# Patient Record
Sex: FEMALE | Race: OTHER
[De-identification: names, ages, dates, MRNs, and addresses within clinical notes are randomized per-mention and may not be internally consistent; named-entity substitution may affect disease eponyms.]

---

## 2017-09-13 ENCOUNTER — RESULT REVIEW (OUTPATIENT)
Age: 52
End: 2017-09-13

## 2021-09-26 ENCOUNTER — APPOINTMENT (OUTPATIENT)
Dept: MRI IMAGING | Facility: CLINIC | Age: 56
End: 2021-09-26
Payer: OTHER MISCELLANEOUS

## 2021-09-26 ENCOUNTER — OUTPATIENT (OUTPATIENT)
Dept: OUTPATIENT SERVICES | Facility: HOSPITAL | Age: 56
LOS: 1 days | End: 2021-09-26
Payer: COMMERCIAL

## 2021-09-26 DIAGNOSIS — Z00.8 ENCOUNTER FOR OTHER GENERAL EXAMINATION: ICD-10-CM

## 2021-09-26 PROCEDURE — 73221 MRI JOINT UPR EXTREM W/O DYE: CPT

## 2021-09-26 PROCEDURE — 73221 MRI JOINT UPR EXTREM W/O DYE: CPT | Mod: 26,LT

## 2022-01-13 ENCOUNTER — APPOINTMENT (OUTPATIENT)
Dept: ORTHOPEDIC SURGERY | Facility: CLINIC | Age: 57
End: 2022-01-13
Payer: OTHER MISCELLANEOUS

## 2022-01-13 ENCOUNTER — FORM ENCOUNTER (OUTPATIENT)
Age: 57
End: 2022-01-13

## 2022-01-13 VITALS
SYSTOLIC BLOOD PRESSURE: 150 MMHG | WEIGHT: 143 LBS | DIASTOLIC BLOOD PRESSURE: 85 MMHG | HEIGHT: 63 IN | HEART RATE: 76 BPM | BODY MASS INDEX: 25.34 KG/M2

## 2022-01-13 DIAGNOSIS — M75.02 ADHESIVE CAPSULITIS OF LEFT SHOULDER: ICD-10-CM

## 2022-01-13 DIAGNOSIS — M75.102 UNSPECIFIED ROTATOR CUFF TEAR OR RUPTURE OF LEFT SHOULDER, NOT SPECIFIED AS TRAUMATIC: ICD-10-CM

## 2022-01-13 PROCEDURE — 99203 OFFICE O/P NEW LOW 30 MIN: CPT

## 2022-01-13 PROCEDURE — 99072 ADDL SUPL MATRL&STAF TM PHE: CPT

## 2022-01-13 RX ORDER — ACETAMINOPHEN 500 MG
500 TABLET ORAL
Refills: 0 | Status: ACTIVE | COMMUNITY

## 2022-01-18 ENCOUNTER — FORM ENCOUNTER (OUTPATIENT)
Age: 57
End: 2022-01-18

## 2022-03-25 ENCOUNTER — APPOINTMENT (OUTPATIENT)
Dept: ORTHOPEDIC SURGERY | Facility: CLINIC | Age: 57
End: 2022-03-25

## 2023-11-22 ENCOUNTER — INPATIENT (INPATIENT)
Facility: HOSPITAL | Age: 58
LOS: 0 days | Discharge: HOME | DRG: 149 | End: 2023-11-23
Attending: FAMILY MEDICINE | Admitting: FAMILY MEDICINE
Payer: COMMERCIAL

## 2023-11-22 VITALS — WEIGHT: 149.91 LBS | HEIGHT: 63 IN

## 2023-11-22 DIAGNOSIS — R42 DIZZINESS AND GIDDINESS: ICD-10-CM

## 2023-11-22 LAB
A1C WITH ESTIMATED AVERAGE GLUCOSE RESULT: 6.1 % — HIGH (ref 4–5.6)
A1C WITH ESTIMATED AVERAGE GLUCOSE RESULT: 6.1 % — HIGH (ref 4–5.6)
ALBUMIN SERPL ELPH-MCNC: 3.7 G/DL — SIGNIFICANT CHANGE UP (ref 3.3–5)
ALBUMIN SERPL ELPH-MCNC: 3.7 G/DL — SIGNIFICANT CHANGE UP (ref 3.3–5)
ALP SERPL-CCNC: 85 U/L — SIGNIFICANT CHANGE UP (ref 40–120)
ALP SERPL-CCNC: 85 U/L — SIGNIFICANT CHANGE UP (ref 40–120)
ALT FLD-CCNC: 24 U/L — SIGNIFICANT CHANGE UP (ref 12–78)
ALT FLD-CCNC: 24 U/L — SIGNIFICANT CHANGE UP (ref 12–78)
ANION GAP SERPL CALC-SCNC: 6 MMOL/L — SIGNIFICANT CHANGE UP (ref 5–17)
ANION GAP SERPL CALC-SCNC: 6 MMOL/L — SIGNIFICANT CHANGE UP (ref 5–17)
APTT BLD: 35.4 SEC — SIGNIFICANT CHANGE UP (ref 24.5–35.6)
APTT BLD: 35.4 SEC — SIGNIFICANT CHANGE UP (ref 24.5–35.6)
AST SERPL-CCNC: 13 U/L — LOW (ref 15–37)
AST SERPL-CCNC: 13 U/L — LOW (ref 15–37)
BASOPHILS # BLD AUTO: 0.03 K/UL — SIGNIFICANT CHANGE UP (ref 0–0.2)
BASOPHILS # BLD AUTO: 0.03 K/UL — SIGNIFICANT CHANGE UP (ref 0–0.2)
BASOPHILS NFR BLD AUTO: 0.5 % — SIGNIFICANT CHANGE UP (ref 0–2)
BASOPHILS NFR BLD AUTO: 0.5 % — SIGNIFICANT CHANGE UP (ref 0–2)
BILIRUB SERPL-MCNC: 0.3 MG/DL — SIGNIFICANT CHANGE UP (ref 0.2–1.2)
BILIRUB SERPL-MCNC: 0.3 MG/DL — SIGNIFICANT CHANGE UP (ref 0.2–1.2)
BUN SERPL-MCNC: 12 MG/DL — SIGNIFICANT CHANGE UP (ref 7–23)
BUN SERPL-MCNC: 12 MG/DL — SIGNIFICANT CHANGE UP (ref 7–23)
CALCIUM SERPL-MCNC: 9 MG/DL — SIGNIFICANT CHANGE UP (ref 8.5–10.1)
CALCIUM SERPL-MCNC: 9 MG/DL — SIGNIFICANT CHANGE UP (ref 8.5–10.1)
CHLORIDE SERPL-SCNC: 107 MMOL/L — SIGNIFICANT CHANGE UP (ref 96–108)
CHLORIDE SERPL-SCNC: 107 MMOL/L — SIGNIFICANT CHANGE UP (ref 96–108)
CK SERPL-CCNC: 47 U/L — SIGNIFICANT CHANGE UP (ref 26–192)
CK SERPL-CCNC: 47 U/L — SIGNIFICANT CHANGE UP (ref 26–192)
CO2 SERPL-SCNC: 25 MMOL/L — SIGNIFICANT CHANGE UP (ref 22–31)
CO2 SERPL-SCNC: 25 MMOL/L — SIGNIFICANT CHANGE UP (ref 22–31)
CREAT SERPL-MCNC: 0.62 MG/DL — SIGNIFICANT CHANGE UP (ref 0.5–1.3)
CREAT SERPL-MCNC: 0.62 MG/DL — SIGNIFICANT CHANGE UP (ref 0.5–1.3)
EGFR: 103 ML/MIN/1.73M2 — SIGNIFICANT CHANGE UP
EGFR: 103 ML/MIN/1.73M2 — SIGNIFICANT CHANGE UP
EOSINOPHIL # BLD AUTO: 0.11 K/UL — SIGNIFICANT CHANGE UP (ref 0–0.5)
EOSINOPHIL # BLD AUTO: 0.11 K/UL — SIGNIFICANT CHANGE UP (ref 0–0.5)
EOSINOPHIL NFR BLD AUTO: 1.7 % — SIGNIFICANT CHANGE UP (ref 0–6)
EOSINOPHIL NFR BLD AUTO: 1.7 % — SIGNIFICANT CHANGE UP (ref 0–6)
ESTIMATED AVERAGE GLUCOSE: 128 MG/DL — HIGH (ref 68–114)
ESTIMATED AVERAGE GLUCOSE: 128 MG/DL — HIGH (ref 68–114)
GLUCOSE SERPL-MCNC: 112 MG/DL — HIGH (ref 70–99)
GLUCOSE SERPL-MCNC: 112 MG/DL — HIGH (ref 70–99)
HCT VFR BLD CALC: 40.2 % — SIGNIFICANT CHANGE UP (ref 34.5–45)
HCT VFR BLD CALC: 40.2 % — SIGNIFICANT CHANGE UP (ref 34.5–45)
HGB BLD-MCNC: 13.5 G/DL — SIGNIFICANT CHANGE UP (ref 11.5–15.5)
HGB BLD-MCNC: 13.5 G/DL — SIGNIFICANT CHANGE UP (ref 11.5–15.5)
IMM GRANULOCYTES NFR BLD AUTO: 0.3 % — SIGNIFICANT CHANGE UP (ref 0–0.9)
IMM GRANULOCYTES NFR BLD AUTO: 0.3 % — SIGNIFICANT CHANGE UP (ref 0–0.9)
INR BLD: 0.91 RATIO — SIGNIFICANT CHANGE UP (ref 0.85–1.18)
INR BLD: 0.91 RATIO — SIGNIFICANT CHANGE UP (ref 0.85–1.18)
LYMPHOCYTES # BLD AUTO: 2.64 K/UL — SIGNIFICANT CHANGE UP (ref 1–3.3)
LYMPHOCYTES # BLD AUTO: 2.64 K/UL — SIGNIFICANT CHANGE UP (ref 1–3.3)
LYMPHOCYTES # BLD AUTO: 41.4 % — SIGNIFICANT CHANGE UP (ref 13–44)
LYMPHOCYTES # BLD AUTO: 41.4 % — SIGNIFICANT CHANGE UP (ref 13–44)
MCHC RBC-ENTMCNC: 28.8 PG — SIGNIFICANT CHANGE UP (ref 27–34)
MCHC RBC-ENTMCNC: 28.8 PG — SIGNIFICANT CHANGE UP (ref 27–34)
MCHC RBC-ENTMCNC: 33.6 GM/DL — SIGNIFICANT CHANGE UP (ref 32–36)
MCHC RBC-ENTMCNC: 33.6 GM/DL — SIGNIFICANT CHANGE UP (ref 32–36)
MCV RBC AUTO: 85.7 FL — SIGNIFICANT CHANGE UP (ref 80–100)
MCV RBC AUTO: 85.7 FL — SIGNIFICANT CHANGE UP (ref 80–100)
MONOCYTES # BLD AUTO: 0.45 K/UL — SIGNIFICANT CHANGE UP (ref 0–0.9)
MONOCYTES # BLD AUTO: 0.45 K/UL — SIGNIFICANT CHANGE UP (ref 0–0.9)
MONOCYTES NFR BLD AUTO: 7.1 % — SIGNIFICANT CHANGE UP (ref 2–14)
MONOCYTES NFR BLD AUTO: 7.1 % — SIGNIFICANT CHANGE UP (ref 2–14)
NEUTROPHILS # BLD AUTO: 3.13 K/UL — SIGNIFICANT CHANGE UP (ref 1.8–7.4)
NEUTROPHILS # BLD AUTO: 3.13 K/UL — SIGNIFICANT CHANGE UP (ref 1.8–7.4)
NEUTROPHILS NFR BLD AUTO: 49 % — SIGNIFICANT CHANGE UP (ref 43–77)
NEUTROPHILS NFR BLD AUTO: 49 % — SIGNIFICANT CHANGE UP (ref 43–77)
PLATELET # BLD AUTO: 320 K/UL — SIGNIFICANT CHANGE UP (ref 150–400)
PLATELET # BLD AUTO: 320 K/UL — SIGNIFICANT CHANGE UP (ref 150–400)
POTASSIUM SERPL-MCNC: 3.7 MMOL/L — SIGNIFICANT CHANGE UP (ref 3.5–5.3)
POTASSIUM SERPL-MCNC: 3.7 MMOL/L — SIGNIFICANT CHANGE UP (ref 3.5–5.3)
POTASSIUM SERPL-SCNC: 3.7 MMOL/L — SIGNIFICANT CHANGE UP (ref 3.5–5.3)
POTASSIUM SERPL-SCNC: 3.7 MMOL/L — SIGNIFICANT CHANGE UP (ref 3.5–5.3)
PROT SERPL-MCNC: 7.8 GM/DL — SIGNIFICANT CHANGE UP (ref 6–8.3)
PROT SERPL-MCNC: 7.8 GM/DL — SIGNIFICANT CHANGE UP (ref 6–8.3)
PROTHROM AB SERPL-ACNC: 10.3 SEC — SIGNIFICANT CHANGE UP (ref 9.5–13)
PROTHROM AB SERPL-ACNC: 10.3 SEC — SIGNIFICANT CHANGE UP (ref 9.5–13)
RBC # BLD: 4.69 M/UL — SIGNIFICANT CHANGE UP (ref 3.8–5.2)
RBC # BLD: 4.69 M/UL — SIGNIFICANT CHANGE UP (ref 3.8–5.2)
RBC # FLD: 12.9 % — SIGNIFICANT CHANGE UP (ref 10.3–14.5)
RBC # FLD: 12.9 % — SIGNIFICANT CHANGE UP (ref 10.3–14.5)
SODIUM SERPL-SCNC: 138 MMOL/L — SIGNIFICANT CHANGE UP (ref 135–145)
SODIUM SERPL-SCNC: 138 MMOL/L — SIGNIFICANT CHANGE UP (ref 135–145)
TROPONIN I, HIGH SENSITIVITY RESULT: 14.75 NG/L — SIGNIFICANT CHANGE UP
TROPONIN I, HIGH SENSITIVITY RESULT: 14.75 NG/L — SIGNIFICANT CHANGE UP
WBC # BLD: 6.38 K/UL — SIGNIFICANT CHANGE UP (ref 3.8–10.5)
WBC # BLD: 6.38 K/UL — SIGNIFICANT CHANGE UP (ref 3.8–10.5)
WBC # FLD AUTO: 6.38 K/UL — SIGNIFICANT CHANGE UP (ref 3.8–10.5)
WBC # FLD AUTO: 6.38 K/UL — SIGNIFICANT CHANGE UP (ref 3.8–10.5)

## 2023-11-22 PROCEDURE — 99223 1ST HOSP IP/OBS HIGH 75: CPT

## 2023-11-22 PROCEDURE — G1004: CPT

## 2023-11-22 PROCEDURE — 97161 PT EVAL LOW COMPLEX 20 MIN: CPT | Mod: GP

## 2023-11-22 PROCEDURE — 70498 CT ANGIOGRAPHY NECK: CPT | Mod: 26,MA

## 2023-11-22 PROCEDURE — 93010 ELECTROCARDIOGRAM REPORT: CPT

## 2023-11-22 PROCEDURE — 70496 CT ANGIOGRAPHY HEAD: CPT | Mod: 26,MA

## 2023-11-22 PROCEDURE — 36415 COLL VENOUS BLD VENIPUNCTURE: CPT

## 2023-11-22 PROCEDURE — 99285 EMERGENCY DEPT VISIT HI MDM: CPT

## 2023-11-22 PROCEDURE — 93005 ELECTROCARDIOGRAM TRACING: CPT

## 2023-11-22 PROCEDURE — 99222 1ST HOSP IP/OBS MODERATE 55: CPT

## 2023-11-22 PROCEDURE — 70551 MRI BRAIN STEM W/O DYE: CPT | Mod: 26

## 2023-11-22 PROCEDURE — 0042T: CPT | Mod: MA

## 2023-11-22 PROCEDURE — 85027 COMPLETE CBC AUTOMATED: CPT

## 2023-11-22 PROCEDURE — 80061 LIPID PANEL: CPT

## 2023-11-22 PROCEDURE — 70450 CT HEAD/BRAIN W/O DYE: CPT | Mod: 26,MA,59

## 2023-11-22 PROCEDURE — 71045 X-RAY EXAM CHEST 1 VIEW: CPT | Mod: 26

## 2023-11-22 PROCEDURE — 93306 TTE W/DOPPLER COMPLETE: CPT

## 2023-11-22 PROCEDURE — 70551 MRI BRAIN STEM W/O DYE: CPT | Mod: ME

## 2023-11-22 PROCEDURE — 86803 HEPATITIS C AB TEST: CPT

## 2023-11-22 PROCEDURE — 99497 ADVNCD CARE PLAN 30 MIN: CPT | Mod: 25

## 2023-11-22 PROCEDURE — 80048 BASIC METABOLIC PNL TOTAL CA: CPT

## 2023-11-22 RX ORDER — MECLIZINE HCL 12.5 MG
25 TABLET ORAL EVERY 8 HOURS
Refills: 0 | Status: DISCONTINUED | OUTPATIENT
Start: 2023-11-22 | End: 2023-11-23

## 2023-11-22 RX ORDER — MECLIZINE HCL 12.5 MG
25 TABLET ORAL ONCE
Refills: 0 | Status: COMPLETED | OUTPATIENT
Start: 2023-11-22 | End: 2023-11-22

## 2023-11-22 RX ORDER — ACETAMINOPHEN 500 MG
650 TABLET ORAL ONCE
Refills: 0 | Status: COMPLETED | OUTPATIENT
Start: 2023-11-22 | End: 2023-11-22

## 2023-11-22 RX ORDER — ONDANSETRON 8 MG/1
4 TABLET, FILM COATED ORAL ONCE
Refills: 0 | Status: DISCONTINUED | OUTPATIENT
Start: 2023-11-22 | End: 2023-11-23

## 2023-11-22 RX ORDER — ATORVASTATIN CALCIUM 80 MG/1
40 TABLET, FILM COATED ORAL AT BEDTIME
Refills: 0 | Status: DISCONTINUED | OUTPATIENT
Start: 2023-11-22 | End: 2023-11-23

## 2023-11-22 RX ORDER — SODIUM CHLORIDE 9 MG/ML
1000 INJECTION INTRAMUSCULAR; INTRAVENOUS; SUBCUTANEOUS
Refills: 0 | Status: DISCONTINUED | OUTPATIENT
Start: 2023-11-22 | End: 2023-11-23

## 2023-11-22 RX ADMIN — Medication 25 MILLIGRAM(S): at 21:23

## 2023-11-22 RX ADMIN — Medication 650 MILLIGRAM(S): at 12:21

## 2023-11-22 RX ADMIN — Medication 25 MILLIGRAM(S): at 12:21

## 2023-11-22 RX ADMIN — ATORVASTATIN CALCIUM 40 MILLIGRAM(S): 80 TABLET, FILM COATED ORAL at 21:22

## 2023-11-22 RX ADMIN — Medication 650 MILLIGRAM(S): at 14:11

## 2023-11-22 RX ADMIN — SODIUM CHLORIDE 75 MILLILITER(S): 9 INJECTION INTRAMUSCULAR; INTRAVENOUS; SUBCUTANEOUS at 14:17

## 2023-11-22 NOTE — ED PROVIDER NOTE - OBJECTIVE STATEMENT
57 yo female w/ no PMHx presents to the ED with dizziness onset last night and HA onset this morning. Pt had one episode of dizziness last night before going to bed at 11pm, no lightheadedness, no room spinning sensation. Pt then woke up with dizziness this morning and was unable to drive due to feeling off balance. Pt was unable to drive to the ED this morning and had to have her son drive her to the ED. Pt in the car on the way here developed an occipital HA. Pt denying nausea, no blurry vision, no double vision, no weakness or numbness in any of her extremities. Code Stroke called upon arrival to ED. 59 yo female w/PMHx congential hemangioma, not on any medications presents to the ED with dizziness onset last night and HA onset this morning. Pt had one episode of dizziness last night before going to bed at 11pm, no lightheadedness, no room spinning sensation. Pt then woke up with dizziness this morning and was unable to drive due to feeling off balance. Pt was unable to drive to the ED this morning and had to have her son drive her to the ED. Pt in the car on the way here developed an occipital HA. Pt denying nausea, no blurry vision, no double vision, no weakness or numbness in any of her extremities. Code Stroke called upon arrival to ED. Pt not on AC. 57 yo female w/ PMHx of congenital hemangioma, not on any medications, BIB pvt car to the ED with dizziness onset last night, worsened this AM and HA onset this morning. Pt had one episode of dizziness last night before going to bed at 11pm, no lightheadedness, no room spinning sensation. After awoke this AM, pt noted continued dizziness, + worse and was unable to drive due to feeling off balance. Pt was unable to drive to the ED this morning and had to have her son drive her to the ED. Pt in the car on the way here developed an occipital HA. Pt denies nausea, no blurry vision, no double vision, no focal weakness or numbness in her face nor extremities. Code Stroke called upon arrival to ED. Pt not on AC med nor ASA.

## 2023-11-22 NOTE — PHARMACOTHERAPY INTERVENTION NOTE - COMMENTS
Medication history complete, reviewed medications with patient and confirmed with doctor first med hx. Patient is not on meds at home.

## 2023-11-22 NOTE — ED ADULT TRIAGE NOTE - CHIEF COMPLAINT QUOTE
Pt ambulatory to ED c/o new onset dizziness since 11pm last night w/ headache x1 hour ago. Pt states that last night before she went to bed she felt "off balance". This morning she woke up and felt like symptoms are worsening, pt unable to walk w/out feeling dizzy. BEFAST +. MD Gifford in triage to assess, code stroke called at 0946.

## 2023-11-22 NOTE — H&P ADULT - NSHPLABSRESULTS_GEN_ALL_CORE
EKG NSR@ 89 bpm, nonspecific ST/T wave changes     Prelim CXR No infiltrates or effusion    Lab Results:  CBC  CBC Full  -  ( 22 Nov 2023 10:00 )  WBC Count : 6.38 K/uL  RBC Count : 4.69 M/uL  Hemoglobin : 13.5 g/dL  Hematocrit : 40.2 %  Platelet Count - Automated : 320 K/uL  Mean Cell Volume : 85.7 fl  Mean Cell Hemoglobin : 28.8 pg  Mean Cell Hemoglobin Concentration : 33.6 gm/dL  Auto Neutrophil # : 3.13 K/uL  Auto Lymphocyte # : 2.64 K/uL  Auto Monocyte # : 0.45 K/uL  Auto Eosinophil # : 0.11 K/uL  Auto Basophil # : 0.03 K/uL  Auto Neutrophil % : 49.0 %  Auto Lymphocyte % : 41.4 %  Auto Monocyte % : 7.1 %  Auto Eosinophil % : 1.7 %  Auto Basophil % : 0.5 %    .		Differential:	[] Automated		[] Manual  Chemistry                        13.5   6.38  )-----------( 320      ( 22 Nov 2023 10:00 )             40.2     11-22    138  |  107  |  12  ----------------------------<  112<H>  3.7   |  25  |  0.62    Ca    9.0      22 Nov 2023 10:00    TPro  7.8  /  Alb  3.7  /  TBili  0.3  /  DBili  x   /  AST  13<L>  /  ALT  24  /  AlkPhos  85  11-22    LIVER FUNCTIONS - ( 22 Nov 2023 10:00 )  Alb: 3.7 g/dL / Pro: 7.8 gm/dL / ALK PHOS: 85 U/L / ALT: 24 U/L / AST: 13 U/L / GGT: x           PT/INR - ( 22 Nov 2023 10:00 )   PT: 10.3 sec;   INR: 0.91 ratio         PTT - ( 22 Nov 2023 10:00 )  PTT:35.4 sec  Urinalysis Basic - ( 22 Nov 2023 10:00 )    Color: x / Appearance: x / SG: x / pH: x  Gluc: 112 mg/dL / Ketone: x  / Bili: x / Urobili: x   Blood: x / Protein: x / Nitrite: x   Leuk Esterase: x / RBC: x / WBC x   Sq Epi: x / Non Sq Epi: x / Bacteria: x      RADIOLOGY RESULTS:    < from: CT Brain Perfusion Maps Stroke (11.22.23 @ 10:05) >      IMPRESSION:    Negative CTP. No large vessel occlusion. Consider MRI of the brain for   further evaluation.< from: CT Brain Stroke Protocol (11.22.23 @ 09:57) >      IMPRESSION:  Minimal chronic microvascular changes without evidence of an   acute transcortical infarction. Subtle hyperdensity in the right frontal   lobe measuring 5 mm may suggest mineralization versus cavernoma versus   less likely hemorrhage. Consider MRI for further evaluatio    < end of copied text >

## 2023-11-22 NOTE — ED PROVIDER NOTE - PROGRESS NOTE DETAILS
Gage Baron for attending Dr. Gifford   Spoke with radiology, CT head verbal report no acute stroke no obvious hemorrhage, hyper density in right frontal lobe/mass. Gage Baron for attending Dr. Gifford   Neuro consulted, agrees not a thrombolytic candidate. addis Gifford MD:  Labs not actionable.  Dr. Waters aware of Tele admission.  MRI brain ordered by neuro. addis Gifford MD:  Labs not actionable.  Dr. Waters aware of Tele admission.  MRI brain ordered by Neuro. Gage Baron for attending Dr. Gifford   Spoke with radiology, CT head verbal report no acute stroke, no obvious hemorrhage, +hyperdensity/mass  in right frontal lobe.

## 2023-11-22 NOTE — ED ADULT NURSE NOTE - NSFALLRISKINTERV_ED_ALL_ED

## 2023-11-22 NOTE — H&P ADULT - HISTORY OF PRESENT ILLNESS
57 yo female w/PMHx congential R frontal cavernous hemangioma, not on any medications presents to the ED with dizziness/lightheadedness and unsteady gait at 11pm last night.  Pt. went to bed and this morning felt worse with dizziness and unsteady gait.  Denies vertigo, slurred speech, facial droop, focal weakness/numbness. Pt was unable to drive to the ED this morning and had to have her son drive her to the ED. Pt in the car on the way here developed an occipital HA. Pt denying nausea, no blurry vision.  Code Stroke called upon arrival to ED.  NIHSS 0, CT head/CTP/CTA neg for any acute infarct, ischemia, LVO.  Subtle hyperdensity in the right frontal lobe measuring 5 mm image 18 of series 2 may suggest mineralization versus cavernoma versus less likely hemorrhage (this is c/w patient's h/o of R frontal hemangioma)    Patient denies any CP, dyspnea, N/V/D, syncope or palpitations. In the ER, patient received IVF and meclizine. at this time she does not want ASA with hx of hemangioma    Past Medical History: none  Past Surgical History: none  Social History: Lives at home. No tobacco, alcohol or ilict drug use  Family History: mother passed away at 52 with liver cirrhosis and father passed away at 88 from stroke

## 2023-11-22 NOTE — STROKE CODE NOTE - NSMDCONSULT QTN_Y FT
Patient is a 58y old  Female who presents with a chief complaint of     HPI: 59 yo female w/PMHx congential R frontal cavernous hemangioma, not on any medications presents to the ED with dizziness/lightheadedness and unsteady gaitat 11pm last night.  Pt. went to bed and this morning felt worse with dizziness and unsteady gait.  Denies vertigo, slurred speech, facial droop, focal weakness/numbness. Pt was unable to drive to the ED this morning and had to have her son drive her to the ED. Pt in the car on the way here developed an occipital HA. Pt denying nausea, no blurry vision.  Code Stroke called upon arrival to ED.  NIHSS 0, CT head/CTP/CTA neg for any acute infarct, ischemia, LVO.  Subtle hyperdensity in the right frontal lobe measuring 5 mm image 18 of series 2 may suggest mineralization versus cavernoma versus less likely hemorrhage (this is c/w patient's h/o of R frontal hemangioma)        PAST MEDICAL & SURGICAL HISTORY:  diet controlled DM    FAMILY HISTORY: No sig hx in 1st degree relatives      Social Hx:  Nonsmoker, no drug or alcohol use    MEDICATIONS  (STANDING):  None    Allergies  No Known Allergies      ROS: Pertinent positives in HPI, all other ROS were reviewed and are negative.      Vital Signs Last 24 Hrs  T(C): 37.3 (22 Nov 2023 09:50), Max: 37.3 (22 Nov 2023 09:50)  T(F): 99.1 (22 Nov 2023 09:50), Max: 99.1 (22 Nov 2023 09:50)  HR: 78 (22 Nov 2023 09:50) (78 - 78)  BP: 167/78 (22 Nov 2023 09:50) (167/78 - 167/78)  BP(mean): --  RR: 18 (22 Nov 2023 09:50) (18 - 18)  SpO2: 100% (22 Nov 2023 09:50) (100% - 100%)      Constitutional: NAD, well-groomed, well-developed  HEAD: NC/AT   Neck: Supple.  Gastrointestinal: soft, nontender  Extremities:  no edema  Musculoskeletal: no joint swelling/tenderness, no abnormal movements  Skin: No rashes    Neurological exam:  HF: A x O x 3. Appropriately interactive, normal affect. Speech fluent, No Aphasia or paraphasic errors. Naming /repetition intact   CN: ROSA ISELA, No nystagmus.  EOMI, VFF, facial sensation normal, no NLFD, tongue midline, Palate moves equally, SCM equal bilaterally  Motor: No pronator drift, Strength 5/5 in all 4 ext, normal bulk and tone, no tremors  Sens: Intact to light touch   Reflexes: Symmetric and normal, downgoing toes b/l  Coord:  No FNFA, TOMMY intact, HTS intact b/l  Gait/Balance: +Romberg's    NIHSS: 0          Labs:                         13.5   6.38  )-----------( 320      ( 22 Nov 2023 10:00 )             40.2       Radiology:  < from: CT Angio Brain Stroke Protocol  w/ IV Cont (11.22.23 @ 10:04) >    FINDINGS:      CTA BRAIN:  The [place] and vertebrobasilar system are unremarkable without   evidence of stenosis, occlusion or saccular aneurysm dilation. No   evidence for arterial venous malformation. The vertebral arteries are   codominant.      CTA NECK:  A left-sided aortic arch is demonstrated. Thereis normal relationship to   the great vessels. The common carotid arteries, internal carotid arteries   and vertebral arteries are normal in caliber. No evidence of stenosis,   occlusion or saccular aneurysm dilation. The vertebral arteries are   codominant.    CT PERFUSION:  Patient has only had less than 2 hours of symptoms may influence the CT   perfusion.    No core infarct or evidence of delayed mean transit time is identified.    CBF<30% volume: 0 ml  Tmax>6.0 s volume: 0 ml  Mismatch volume: 0 ml  Mismatch ratio: none          IMPRESSION:    Negative CTP. No large vessel occlusion. Consider MRI of the brain for   further evaluation.      A/P: 59 yo female w/PMHx congential R frontal cavernous hemangioma, not on any medications presents to the ED with dizziness/lightheadedness and unsteady gaitat 11pm last night.  Pt. went to bed and this morning felt worse with dizziness and unsteady gait.  Denies vertigo, slurred speech, facial droop, focal weakness/numbness. Pt was unable to drive to the ED this morning and had to have her son drive her to the ED. Pt in the car on the way here developed an occipital HA. Pt denying nausea, no blurry vision.      Code Stroke called upon arrival to ED.  NIHSS 0, CT head/CTP/CTA neg for any acute infarct, ischemia, LVO.  Subtle hyperdensity in the right frontal lobe measuring 5 mm image 18 of series 2 may suggest mineralization versus cavernoma versus less likely hemorrhage (this is c/w patient's h/o of R frontal hemangioma).  PE + for Romberg's sign with patient falling backward.  During CT scan when lying down patient did feel vertigo.    #Dizziness/unsteady gait-will need to r/o cerebellar stroke.  This could aslo be BPPV      Recommendations:  -Monitor on tele  -MRI brain ordered  -check orthostatics  -check for any metabolic instability  -can treat for vertigo and if MRI is negative for acute findings no objection to DC if sx's resolve      D/W Dr. Whelan

## 2023-11-22 NOTE — ED PROVIDER NOTE - PHYSICAL EXAMINATION
General:  adult female, no respiratory distress, in NAD  Eyes: PERRL EOMI, no nystagmus  Mouth: oropharynx clear, mucous membranes slightly dry  Heart: regular rate and regular rhythm, normal radial pulse  Lungs: clear, no respiratory distress  Gu: deferred no CVA tenderness  Abdomen: soft non tender, bowel sounds positive  Musculoskeletal: no focal swelling or tenderness, WHEAT x4  Neck: supple non tender, no meningismus  Skin: no tactile warmth no rash  Neuro: a&o x3, cn 2-12 intact, no focal sensory or motor deficits, finger to nose negative General:  adult female, no respiratory distress, in NAD  Eyes: PERRL EOMI, no nystagmus  Mouth: oropharynx clear, mucous membranes slightly dry  Heart: regular rate and regular rhythm, normal radial pulse  Lungs: clear, no respiratory distress  Gu: deferred, no CVA tenderness  Abdomen: soft nontender, bowel sounds positive  Musculoskeletal: no focal swelling or tenderness, WHEAT x4  Neck: supple nontender, no meningismus  Skin: no tactile warmth no rash  Neuro: a&o x3, cn 2-12 intact, no focal sensory nor motor deficits, finger to nose normal, normal speech.  NIHSS = 0.

## 2023-11-22 NOTE — H&P ADULT - CONVERSATION DETAILS
Advanced Care Planning 47 minutes.  Discussion with patient regarding advance directives. We discussed diagnosis, prognosis and goals of care. At this time She wishes to be fully resuscitated and mechanically ventilated if need arises. There are no limitations of any sort in her medical care and management. She is FULL CODE.

## 2023-11-22 NOTE — PATIENT PROFILE ADULT - DEAF OR HARD OF HEARING?
Patient called this morning and spoke with Anette.    Stated that [facility] still had not received our faxed referral    Explained that we had faxed to 2 different fax numbers; [phone] and [phone]     I called [facility] and spoke to Dr Cunha's nurse and was informed that they have never received the referral, and that her appointment is scheduled for tomorrow and they need to verify coverage with . She states that the correct fax number is [phone] and asks that we please fax it, asap, and she will inform their financial person to call me at my direct number, [phone] if they don't receive it and/or to inform me that they did receive it.   no

## 2023-11-22 NOTE — H&P ADULT - ASSESSMENT
#Dizziness/unsteady gait  r/o cerebellar stroke. Possible BPPV  -Admit to tele   -IVF  -Neuro checks  -MRI brain ordered  -check orthostatics  -Meclizine PRN   -Neuro consult appreciated if MRI is negative for acute findings no objection to DC if sx's resolve  -PT eval    #DM Diet controlled  -FU A1C     #VTE  -SCDS

## 2023-11-22 NOTE — ED ADULT NURSE NOTE - OBJECTIVE STATEMENT
Pt presents to ED c/o dizziness. pt reports she started feeling dizziness last night at 11pm. this morning dizziness worsened to the point she had great difficulty ambulating. code stroke on ED arrival.

## 2023-11-23 ENCOUNTER — TRANSCRIPTION ENCOUNTER (OUTPATIENT)
Age: 58
End: 2023-11-23

## 2023-11-23 VITALS
SYSTOLIC BLOOD PRESSURE: 130 MMHG | HEART RATE: 63 BPM | DIASTOLIC BLOOD PRESSURE: 71 MMHG | OXYGEN SATURATION: 99 % | TEMPERATURE: 98 F | RESPIRATION RATE: 18 BRPM

## 2023-11-23 LAB
ANION GAP SERPL CALC-SCNC: 4 MMOL/L — LOW (ref 5–17)
ANION GAP SERPL CALC-SCNC: 4 MMOL/L — LOW (ref 5–17)
BUN SERPL-MCNC: 13 MG/DL — SIGNIFICANT CHANGE UP (ref 7–23)
BUN SERPL-MCNC: 13 MG/DL — SIGNIFICANT CHANGE UP (ref 7–23)
CALCIUM SERPL-MCNC: 8.7 MG/DL — SIGNIFICANT CHANGE UP (ref 8.5–10.1)
CALCIUM SERPL-MCNC: 8.7 MG/DL — SIGNIFICANT CHANGE UP (ref 8.5–10.1)
CHLORIDE SERPL-SCNC: 110 MMOL/L — HIGH (ref 96–108)
CHLORIDE SERPL-SCNC: 110 MMOL/L — HIGH (ref 96–108)
CHOLEST SERPL-MCNC: 169 MG/DL — SIGNIFICANT CHANGE UP
CHOLEST SERPL-MCNC: 169 MG/DL — SIGNIFICANT CHANGE UP
CO2 SERPL-SCNC: 28 MMOL/L — SIGNIFICANT CHANGE UP (ref 22–31)
CO2 SERPL-SCNC: 28 MMOL/L — SIGNIFICANT CHANGE UP (ref 22–31)
CREAT SERPL-MCNC: 0.55 MG/DL — SIGNIFICANT CHANGE UP (ref 0.5–1.3)
CREAT SERPL-MCNC: 0.55 MG/DL — SIGNIFICANT CHANGE UP (ref 0.5–1.3)
EGFR: 106 ML/MIN/1.73M2 — SIGNIFICANT CHANGE UP
EGFR: 106 ML/MIN/1.73M2 — SIGNIFICANT CHANGE UP
GLUCOSE SERPL-MCNC: 122 MG/DL — HIGH (ref 70–99)
GLUCOSE SERPL-MCNC: 122 MG/DL — HIGH (ref 70–99)
HCT VFR BLD CALC: 37.6 % — SIGNIFICANT CHANGE UP (ref 34.5–45)
HCT VFR BLD CALC: 37.6 % — SIGNIFICANT CHANGE UP (ref 34.5–45)
HCV AB S/CO SERPL IA: 0.05 S/CO — SIGNIFICANT CHANGE UP (ref 0–0.99)
HCV AB S/CO SERPL IA: 0.05 S/CO — SIGNIFICANT CHANGE UP (ref 0–0.99)
HCV AB SERPL-IMP: SIGNIFICANT CHANGE UP
HCV AB SERPL-IMP: SIGNIFICANT CHANGE UP
HDLC SERPL-MCNC: 53 MG/DL — SIGNIFICANT CHANGE UP
HDLC SERPL-MCNC: 53 MG/DL — SIGNIFICANT CHANGE UP
HGB BLD-MCNC: 12.6 G/DL — SIGNIFICANT CHANGE UP (ref 11.5–15.5)
HGB BLD-MCNC: 12.6 G/DL — SIGNIFICANT CHANGE UP (ref 11.5–15.5)
LIPID PNL WITH DIRECT LDL SERPL: 98 MG/DL — SIGNIFICANT CHANGE UP
LIPID PNL WITH DIRECT LDL SERPL: 98 MG/DL — SIGNIFICANT CHANGE UP
MCHC RBC-ENTMCNC: 29 PG — SIGNIFICANT CHANGE UP (ref 27–34)
MCHC RBC-ENTMCNC: 29 PG — SIGNIFICANT CHANGE UP (ref 27–34)
MCHC RBC-ENTMCNC: 33.5 GM/DL — SIGNIFICANT CHANGE UP (ref 32–36)
MCHC RBC-ENTMCNC: 33.5 GM/DL — SIGNIFICANT CHANGE UP (ref 32–36)
MCV RBC AUTO: 86.6 FL — SIGNIFICANT CHANGE UP (ref 80–100)
MCV RBC AUTO: 86.6 FL — SIGNIFICANT CHANGE UP (ref 80–100)
NON HDL CHOLESTEROL: 116 MG/DL — SIGNIFICANT CHANGE UP
NON HDL CHOLESTEROL: 116 MG/DL — SIGNIFICANT CHANGE UP
PLATELET # BLD AUTO: 282 K/UL — SIGNIFICANT CHANGE UP (ref 150–400)
PLATELET # BLD AUTO: 282 K/UL — SIGNIFICANT CHANGE UP (ref 150–400)
POTASSIUM SERPL-MCNC: 3.9 MMOL/L — SIGNIFICANT CHANGE UP (ref 3.5–5.3)
POTASSIUM SERPL-MCNC: 3.9 MMOL/L — SIGNIFICANT CHANGE UP (ref 3.5–5.3)
POTASSIUM SERPL-SCNC: 3.9 MMOL/L — SIGNIFICANT CHANGE UP (ref 3.5–5.3)
POTASSIUM SERPL-SCNC: 3.9 MMOL/L — SIGNIFICANT CHANGE UP (ref 3.5–5.3)
RBC # BLD: 4.34 M/UL — SIGNIFICANT CHANGE UP (ref 3.8–5.2)
RBC # BLD: 4.34 M/UL — SIGNIFICANT CHANGE UP (ref 3.8–5.2)
RBC # FLD: 12.8 % — SIGNIFICANT CHANGE UP (ref 10.3–14.5)
RBC # FLD: 12.8 % — SIGNIFICANT CHANGE UP (ref 10.3–14.5)
SODIUM SERPL-SCNC: 142 MMOL/L — SIGNIFICANT CHANGE UP (ref 135–145)
SODIUM SERPL-SCNC: 142 MMOL/L — SIGNIFICANT CHANGE UP (ref 135–145)
TRIGL SERPL-MCNC: 98 MG/DL — SIGNIFICANT CHANGE UP
TRIGL SERPL-MCNC: 98 MG/DL — SIGNIFICANT CHANGE UP
WBC # BLD: 4.83 K/UL — SIGNIFICANT CHANGE UP (ref 3.8–10.5)
WBC # BLD: 4.83 K/UL — SIGNIFICANT CHANGE UP (ref 3.8–10.5)
WBC # FLD AUTO: 4.83 K/UL — SIGNIFICANT CHANGE UP (ref 3.8–10.5)
WBC # FLD AUTO: 4.83 K/UL — SIGNIFICANT CHANGE UP (ref 3.8–10.5)

## 2023-11-23 PROCEDURE — 99232 SBSQ HOSP IP/OBS MODERATE 35: CPT

## 2023-11-23 PROCEDURE — 99239 HOSP IP/OBS DSCHRG MGMT >30: CPT

## 2023-11-23 PROCEDURE — 93306 TTE W/DOPPLER COMPLETE: CPT | Mod: 26

## 2023-11-23 RX ORDER — MECLIZINE HCL 12.5 MG
1 TABLET ORAL
Qty: 15 | Refills: 0
Start: 2023-11-23 | End: 2023-11-27

## 2023-11-23 RX ADMIN — Medication 25 MILLIGRAM(S): at 05:56

## 2023-11-23 NOTE — PHYSICAL THERAPY INITIAL EVALUATION ADULT - GENERAL OBSERVATIONS, REHAB EVAL
Pt was found lying in bed on tele, Pt is pleasant and willing to participate in PT, having mild dizziness when moves her head too fast

## 2023-11-23 NOTE — DISCHARGE NOTE PROVIDER - NSDCCPCAREPLAN_GEN_ALL_CORE_FT
PRINCIPAL DISCHARGE DIAGNOSIS  Diagnosis: Peripheral vertigo  Assessment and Plan of Treatment: C/w Meclizine as needed  F/u with PCP

## 2023-11-23 NOTE — PHYSICAL THERAPY INITIAL EVALUATION ADULT - NSPTDISCHREC_GEN_A_CORE
Pt was able to amb 150' independently w/o any AD, steady on feet,, having mild dizziness when moves her head too fast, Pt was left sitting up in bed at end of PT rx, pt does not require acute care PT, DC from PT, pt could benefit from Outpt PT for vestibular therapy/Outpatient PT

## 2023-11-23 NOTE — DISCHARGE NOTE NURSING/CASE MANAGEMENT/SOCIAL WORK - NSDCPEFALRISK_GEN_ALL_CORE
For information on Fall & Injury Prevention, visit: [email or web address] OR  [email or web address] OR  https://www.cdc.gov/steadi/patient.html

## 2023-11-23 NOTE — PHYSICAL THERAPY INITIAL EVALUATION ADULT - DIAGNOSIS, PT EVAL
Dizziness, unsteady gait:, Known right frontal cavernoma, Dizziness/unsteady gait  r/o cerebellar stroke. Possible BPPV

## 2023-11-23 NOTE — DISCHARGE NOTE PROVIDER - HOSPITAL COURSE
57 yo female w/PMHx congential R frontal cavernous hemangioma, Diet controlled DM presented to the ED with dizziness/lightheadedness and unsteady gait started  night prior.  Pt  went to bed and next morning felt worse with dizziness and unsteady gait.  Denies   slurred speech, facial droop, focal weakness/numbness. Pt was unable to drive to the ED  and had to have her son drive her to the ED. Pt in the car on the way here developed an occipital HA. Pt denied nausea, no blurry vision.  Code Stroke called upon arrival to ED.  NIHSS 0, CT head/CTP/CTA neg for any acute infarct, ischemia, LVO.  Subtle hyperdensity in the right frontal lobe measuring 5 mm image 18 of series 2 may suggest mineralization versus cavernoma versus less likely hemorrhage   In the ER, patient received IVF and meclizine. at this time she does not want ASA with hx of hemangioma.  Pt was evaluated buy neuro. MRI was done and was negative, confirmed R  frontal cavernoma. Labs with HB A1c 6.1 D/w DR Whelan, likely peripheral vertigo. C/w Meclizine PRN. Cleared for DC. Pt will need to f/u with her neuroSx for further monitoring R Frontal cavernoma. Pts HB A1c 6.1 Diet discussed  Today Pt was evaluated, reports  feels well, dizziness resolved, no N/V, tolerates PO. Ambulates to the bathroom. Results, meds and outPt f/u discussed   ROS: neg   All labs and Imaging reviewed     Vital Signs Last 24 Hrs  T(C): 36.6 (23 Nov 2023 08:49), Max: 37.3 (22 Nov 2023 09:50)  T(F): 97.8 (23 Nov 2023 08:49), Max: 99.1 (22 Nov 2023 09:50)  HR: 63 (23 Nov 2023 08:49) (63 - 89)  BP: 130/71 (23 Nov 2023 08:49) (111/50 - 167/78)  RR: 18 (23 Nov 2023 08:49) (16 - 18)  SpO2: 99% (23 Nov 2023 08:49) (97% - 100%)    PHYSICAL EXAM:  General: in no acute distress  Eyes: PERRLA, EOMI; conjunctiva and sclera clear  Head: Normocephalic; atraumatic  ENMT: No nasal discharge; airway clear  Neck: Supple; non tender; no masses  Respiratory: No wheezes, rales or rhonchi  Cardiovascular: Regular rate and rhythm. S1 and S2 Normal;   Gastrointestinal: Soft non-tender non-distended; Normal bowel sounds  Genitourinary: No  suprapubic  tenderness  Extremities: No edema  Neurological: Alert and oriented x2,  no facial asymmetry, speech is clear, MS 5/5   Lymph Nodes: No acute cervical adenopathy  Musculoskeletal: Normal muscle tone, without deformities  Psychiatric: Cooperative and appropriate    A/P: 57 yo female w/PMHx congential R frontal cavernous hemangioma, Diet controlled DM admitted for:     #Dizziness/unsteady gait  due to Peripheral Vertigo  CVA ruled out, unlikely TIA   - tele : SR no events   - Neuro stable  - symptoms resolved   - MRI: neg for stroke,   -orthostatics neg   -Meclizine PRN   - F/u with PCp if symptoms recur will need vestibular Rehab     #DM Diet controlled  - A1C : 6.1       # Incidental finding:   - MRI: noted Mild mucosal thickening in the ethmoid air cells and right maxillary sinus. Small mucous retention cyst in the right maxillary sinus. Suggestive of chronic Sinusitis   - Trial of Flonase advised, Pt states that has medication t home and will use it     Dispo; stable for dc home   FAx d/c summary to PCP   Total time 38 min    57 yo female w/PMHx congential R frontal cavernous hemangioma, Diet controlled DM presented to the ED with dizziness/lightheadedness and unsteady gait started  night prior.  Pt  went to bed and next morning felt worse with dizziness and unsteady gait.  Denies   slurred speech, facial droop, focal weakness/numbness. Pt was unable to drive to the ED  and had to have her son drive her to the ED. Pt in the car on the way here developed an occipital HA. Pt denied nausea, no blurry vision.  Code Stroke called upon arrival to ED.  NIHSS 0, CT head/CTP/CTA neg for any acute infarct, ischemia, LVO.  Subtle hyperdensity in the right frontal lobe measuring 5 mm image 18 of series 2 may suggest mineralization versus cavernoma versus less likely hemorrhage   In the ER, patient received IVF and meclizine. at this time she does not want ASA with hx of hemangioma.  Pt was evaluated buy neuro. MRI was done and was negative, confirmed R  frontal cavernoma. Labs with HB A1c 6.1 D/w DR Whelan, likely peripheral vertigo. C/w Meclizine PRN. Cleared for DC. Pt will need to f/u with her neuroSx for further monitoring R Frontal cavernoma. Pts HB A1c 6.1 Diet discussed  Today Pt was evaluated, reports  feels well, dizziness resolved, no N/V, tolerates PO. Ambulates to the bathroom. Results, meds and outPt f/u discussed   ROS: neg   All labs and Imaging reviewed     Vital Signs Last 24 Hrs  T(C): 36.6 (23 Nov 2023 08:49), Max: 37.3 (22 Nov 2023 09:50)  T(F): 97.8 (23 Nov 2023 08:49), Max: 99.1 (22 Nov 2023 09:50)  HR: 63 (23 Nov 2023 08:49) (63 - 89)  BP: 130/71 (23 Nov 2023 08:49) (111/50 - 167/78)  RR: 18 (23 Nov 2023 08:49) (16 - 18)  SpO2: 99% (23 Nov 2023 08:49) (97% - 100%)    PHYSICAL EXAM:  General: in no acute distress  Eyes: PERRLA, EOMI; conjunctiva and sclera clear  Head: Normocephalic; atraumatic  ENMT: No nasal discharge; airway clear  Neck: Supple; non tender; no masses  Respiratory: No wheezes, rales or rhonchi  Cardiovascular: Regular rate and rhythm. S1 and S2 Normal;   Gastrointestinal: Soft non-tender non-distended; Normal bowel sounds  Genitourinary: No  suprapubic  tenderness  Extremities: No edema  Neurological: Alert and oriented x2,  no facial asymmetry, speech is clear, MS 5/5   Lymph Nodes: No acute cervical adenopathy  Musculoskeletal: Normal muscle tone, without deformities  Psychiatric: Cooperative and appropriate    A/P: 57 yo female w/PMHx congential R frontal cavernous hemangioma, Diet controlled DM admitted for:     #Dizziness/unsteady gait  due to Peripheral Vertigo  CVA ruled out, unlikely TIA   - tele : SR no events   - Neuro stable  - ECHO; preserved EF 55-60%, mild MR/TR  - symptoms resolved   - MRI: neg for stroke,   -orthostatics neg   -Meclizine PRN   - F/u with PCP  if symptoms recur will need vestibular Rehab     #DM Diet controlled  - A1C : 6.1       # Incidental finding:   - MRI: noted Mild mucosal thickening in the ethmoid air cells and right maxillary sinus. Small mucous retention cyst in the right maxillary sinus. Suggestive of chronic Sinusitis   - Trial of Flonase advised, Pt states that has medication t home and will use it     Dispo; stable for dc home   FAx d/c summary to PCP   Total time 38 min

## 2023-11-23 NOTE — PROGRESS NOTE ADULT - SUBJECTIVE AND OBJECTIVE BOX
Interval History:  11/23/23: No new events    MEDICATIONS  (STANDING):  atorvastatin 40 milliGRAM(s) Oral at bedtime  sodium chloride 0.9%. 1000 milliLiter(s) (75 mL/Hr) IV Continuous <Continuous>    MEDICATIONS  (PRN):  meclizine 25 milliGRAM(s) Oral every 8 hours PRN Dizziness  ondansetron Injectable 4 milliGRAM(s) IV Push once PRN Nausea and/or Vomiting      Allergies    No Known Allergies    Intolerances        PHYSICAL EXAM:  Vital Signs Last 24 Hrs  T(F): 98.5 (11-23-23 @ 06:05)  HR: 75 (11-22-23 @ 23:10)  BP: 111/50 (11-22-23 @ 23:10)  RR: 18 (11-23-23 @ 06:05)    GENERAL: NAD, well-groomed, well-developed  HEAD:  Atraumatic, Normocephalic  Neuro:  Awake, alert, no aphasia  CN: PERRL, EOMI, no nystagmus, no facial weakness, tongue protrudes in the midline  motor: normal tone, no pronator drift, full strength in all four extremities  sensory: intact to light touch  coordination: finger to nose intact bilaterally  DTRs: symmetric, plantar responses flexor bilaterally    LABS:                        12.6   4.83  )-----------( 282      ( 23 Nov 2023 07:25 )             37.6     11-23    142  |  110<H>  |  13  ----------------------------<  122<H>  3.9   |  28  |  0.55    Ca    8.7      23 Nov 2023 07:25    TPro  7.8  /  Alb  3.7  /  TBili  0.3  /  DBili  x   /  AST  13<L>  /  ALT  24  /  AlkPhos  85  11-22    PT/INR - ( 22 Nov 2023 10:00 )   PT: 10.3 sec;   INR: 0.91 ratio         PTT - ( 22 Nov 2023 10:00 )  PTT:35.4 sec  Urinalysis Basic - ( 23 Nov 2023 07:25 )    Color: x / Appearance: x / SG: x / pH: x  Gluc: 122 mg/dL / Ketone: x  / Bili: x / Urobili: x   Blood: x / Protein: x / Nitrite: x   Leuk Esterase: x / RBC: x / WBC x   Sq Epi: x / Non Sq Epi: x / Bacteria: x        RADIOLOGY & ADDITIONAL STUDIES:  CT head 11/22/23:  Minimal chronic microvascular changes without evidence of an   acute transcortical infarction. Subtle hyperdensity in the right frontal   lobe measuring 5 mm may suggest mineralization versus cavernoma versus   less likely hemorrhage. Consider MRI for further evaluation    CTA head and neck, CT perfusion 11/22/23:  Negative CTP. No large vessel occlusion.      MRI head 11/22/23:  Subcentimeter cavernoma in the right frontal lobe. Other etiologies can   present similarly but are thought much less likely.     Domenico JUAREZ/alexandra 10/31 8:45AM | intake at [facility] 10/7 2PM

## 2023-11-23 NOTE — PHYSICAL THERAPY INITIAL EVALUATION ADULT - PERTINENT HX OF CURRENT PROBLEM, REHAB EVAL
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   She went to bed and symptoms were worse when she woke up.Pt was unable to drive to the ED this morning and had to have her son drive her to the ED. Pt in the car on the way here developed an occipital HA.   She had no facial droop or focal weakness, numbness.  Code stroke was called in the ED.   MRI Brain: Subcentimeter cavernoma in the right frontal lobe, Dizziness, unsteady gait:  -Likely peripheral vertigo

## 2023-11-23 NOTE — DISCHARGE NOTE NURSING/CASE MANAGEMENT/SOCIAL WORK - PATIENT PORTAL LINK FT
You can access the FollowMyHealth Patient Portal offered by [facility] by registering at the following website: [email or web address]. By joining [facility] FollowMyHealth portal, you will also be able to view your health information using other applications (apps) compatible with our system.

## 2023-11-23 NOTE — PROGRESS NOTE ADULT - ASSESSMENT
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   She went to bed and symptoms were worse when she woke up.  She had no facial droop or focal weakness, numbness.  Code stroke was called in the ED.   NIH Stroke scale score was 0.    Dizziness, unsteady gait:  -Likely peripheral vertigo  -MRI brain is negative for infarct  -She is feeling better today  -Can take meclizine 12.5 to 25 mg q8h prn  -Vestibular therapy if symptoms recur    Cavernoma  -Known right frontal cavernoma measured as subcentimeter on MRI.  -She follows up intermittently with neurosurgery    Okay for discharge from neurology standpoint.   Discussed with Dr. Darby

## 2023-11-29 DIAGNOSIS — H81.399 OTHER PERIPHERAL VERTIGO, UNSPECIFIED EAR: ICD-10-CM

## 2023-11-29 DIAGNOSIS — E11.9 TYPE 2 DIABETES MELLITUS WITHOUT COMPLICATIONS: ICD-10-CM

## 2023-11-29 DIAGNOSIS — D18.02 HEMANGIOMA OF INTRACRANIAL STRUCTURES: ICD-10-CM

## 2023-11-29 DIAGNOSIS — I08.1 RHEUMATIC DISORDERS OF BOTH MITRAL AND TRICUSPID VALVES: ICD-10-CM

## 2023-11-29 DIAGNOSIS — J32.0 CHRONIC MAXILLARY SINUSITIS: ICD-10-CM
